# Patient Record
Sex: FEMALE | URBAN - NONMETROPOLITAN AREA
[De-identification: names, ages, dates, MRNs, and addresses within clinical notes are randomized per-mention and may not be internally consistent; named-entity substitution may affect disease eponyms.]

---

## 2019-05-11 ENCOUNTER — NURSE TRIAGE (OUTPATIENT)
Dept: ADMINISTRATIVE | Age: 64
End: 2019-05-11

## 2019-05-11 NOTE — TELEPHONE ENCOUNTER
Reason for Disposition   Tuberculosis, questions about    Answer Assessment - Initial Assessment Questions  1. PLACE of EXPOSURE: \"Where were you when you were exposed to Tuberculosis? \" (e.g., city, state, country; school, work, hospital)      Visiting an inmate in [facility]  2. TYPE of EXPOSURE: \"How were you exposed? \" (e.g., close contact)      Face to face  3. DATE of EXPOSURE: \"When did the exposure occur? \" (e.g., days)      3 weeks ago  4. PREGNANCY: \"Is there any chance you are pregnant? \" \"When was your last menstrual period? \"      no  5. HIGH RISK for COMPLICATIONS: \"Do you have a weakened immune system? \" (e.g., HIV positive, cancer chemotherapy)      no  6.  TB SKIN TEST: \"When was your last TB Skin Test?\" (e.g., date, never had one, uncertain)  \"What was the result? (e.g., positive, negative;  width in millimeters; caller unsure)      Not known    Protocols used: TUBERCULOSIS EXPOSURE-ADULT-